# Patient Record
Sex: MALE | Race: WHITE | Employment: FULL TIME | URBAN - METROPOLITAN AREA
[De-identification: names, ages, dates, MRNs, and addresses within clinical notes are randomized per-mention and may not be internally consistent; named-entity substitution may affect disease eponyms.]

---

## 2022-10-10 ENCOUNTER — HOSPITAL ENCOUNTER (OUTPATIENT)
Dept: PSYCHIATRY | Age: 57
Setting detail: THERAPIES SERIES
Discharge: HOME | End: 2022-10-10

## 2022-10-10 PROCEDURE — 90791 PSYCH DIAGNOSTIC EVALUATION: CPT

## 2022-10-10 PROCEDURE — 80305 DRUG TEST PRSMV DIR OPT OBS: CPT

## 2022-10-10 PROCEDURE — 90834 PSYTX W PT 45 MINUTES: CPT

## 2022-10-10 ASSESSMENT — ANXIETY QUESTIONNAIRES
3. WORRYING TOO MUCH ABOUT DIFFERENT THINGS: 1
IF YOU CHECKED OFF ANY PROBLEMS ON THIS QUESTIONNAIRE, HOW DIFFICULT HAVE THESE PROBLEMS MADE IT FOR YOU TO DO YOUR WORK, TAKE CARE OF THINGS AT HOME, OR GET ALONG WITH OTHER PEOPLE: NOT DIFFICULT AT ALL
5. BEING SO RESTLESS THAT IT IS HARD TO SIT STILL: 1
1. FEELING NERVOUS, ANXIOUS, OR ON EDGE: 1
6. BECOMING EASILY ANNOYED OR IRRITABLE: 1
4. TROUBLE RELAXING: 1
GAD7 TOTAL SCORE: 5
2. NOT BEING ABLE TO STOP OR CONTROL WORRYING: 0
7. FEELING AFRAID AS IF SOMETHING AWFUL MIGHT HAPPEN: 0
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
## 2022-10-10 NOTE — PROGRESS NOTES
[address] [place] [address] Urinalysis Laboratory Testing and Medical History Physician Order       Location: [] [place] [] Georges Cisse MD., [facility], Medical Director of [facility] Director orders for [address] [place] [address] clinical therapists to collect an urine sample from the below patient,  and medical order for placement in level of care documented on  [facility] 157 scanned order. Client: Nuno Moss   : 1965   Case# [id number]    Urine sample will be collected following the collections guidelines provided on Clinical Reference Laboratory [facility] custody form, and completion of the  [facility] Non-Federal chain of custody drug screening form. During the course of treatment, randomly a urine sample will be collected, at a minimum of one time a month, more frequently as needed, as part of the clinical outpatient alcohol and drug treatment program at [address] [place] [address]. Medical care recommendation for clients experiencing/reporting  medical concerns, who do not have a family physician and willing to attend  medical care will be assisted in seeking medical care. Clinical providers will refer clients to local family physicians practices as part of the  clients treatment plan and assist the client in gaining access to an appointment. Release of information will be requested to support the  clients seeking medical care.             Summary of Medical History  Prior to Admission medications    Not on File     Past Surgical History:   Procedure Laterality Date    APPENDECTOMY      LAPAROSCOPIC APPENDECTOMY  15/89/3813    And umbilical hernia repair     Past Medical History:   Diagnosis Date    Arthritis     Diverticulitis      Patient Active Problem List    Diagnosis Date Noted    Ruptured appendicitis 2016       Surekha Burrell  10/10/2022/10:03 AM
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
## 2022-10-21 ENCOUNTER — HOSPITAL ENCOUNTER (OUTPATIENT)
Dept: PSYCHIATRY | Age: 57
Setting detail: THERAPIES SERIES
Discharge: HOME | End: 2022-10-21

## 2022-10-21 PROCEDURE — 90834 PSYTX W PT 45 MINUTES: CPT
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
## 2022-10-21 NOTE — PROGRESS NOTES
[facility] TREATMENT PLAN      Location: [x] [place] [] [place]    Treatment plan: Initial    Strengths: hard worker, talented with mechanics, takes pride in work    Weakness/Limitations: lacks coping skills for anger     Service/Frequency/Duration: Individual 1 a week for 90 days, Group assigned 1 time a week for 90 days, Urinalysis random for 90 days, AA/NA 1-2 bi weekly for 90 days, and Case Management as needed for 90 days    Diagnosis: F10.10 Nondependent alcohol abuse-unspecified drinking behavior and F12.10 Nondependent cannabis abuse-unspecified use    Level of Care: 1 Outpatient Services     Problem: History of Substance use resulting in being on probation. Goal: Client will enhance personalized knowledge and insight associated with mood altering substances in 90 days   Objectives:   1) Client will remind self of 3-5 detrimental consequences in major life areas regarding AoD use in 90 days Evaluation Date: 1/21/2023 Code: C Continue TBD     2) Client will identify 4 to 8 benefits and gratitude's due to remaining substance free in 90 days: Evaluation Date:1/21/2023 Code: C Continue TBD     3) Client will identify 4 relapse triggers, define relapse, difference between internal and external triggers associated with substance use in 90 days and Evaluation Date:1/21/2023Code: C Continue TBD     2. Problem: Low Self-Esteem/Identify issues from drug and alcohol use.       Goal: Client will learn to focus on character strengths and feel better about himself in 90 days      Objectives:   1) Client will examine 5 strong thoughts and feeling through mediation and share in his individual sessions in 90 days:  Evaluation Date: 1/21/2023 Code: C Continue TBD      2) Client will explore 2-3 new outdoor/indoor activities that bring him merrill in 90 days Evaluation Date:1/21/2023 Code: C Continue TBD      3)  Utilize, if needed case management services provided by [employer] to enhance abstaining from substance use in 90 days Evaluation Date:1/21/2023 Code: C Continue TBD       3. Problem: History of Relapse due to lack of sober support. Goal: Client will increase his sober network to maintain long term sobriety in 90 days. Objectives:  1)  Client will attend 1-2 AA/NA in person or online meetings Biweekly to avoid relapse and build support in 90 days Evaluation Date:1/21/2023 Code: C Continue TBD     2) Client will enhance 4 to 8 healthy techniques and coping skills, for relapse prevention in 90 days Evaluation Date:1/21/2023 Code: C Continue TBD    3) Client will explore 3 new behaviors and resolve ambivalence associated with commitment to change behaviors related to substance use and addiction in 90 days. Evaluation Date:1/21/2023 Code: C Continue TBD    Defer: Client will get off probation. Discharge Plan/Instructions: Client will complete his treatment plan goals and objectives and remain abstinent from alcohol. He will remain compliant with his medical marijuana card. Sierra [place] / 1965 has participated in the treatment plan development outlined above on 10/21/2022.      Syed Harding, [facility]  10/21/2022/9:13 AM

## 2022-10-24 ENCOUNTER — HOSPITAL ENCOUNTER (OUTPATIENT)
Dept: PSYCHIATRY | Age: 57
Setting detail: THERAPIES SERIES
Discharge: HOME | End: 2022-10-24

## 2022-10-24 PROCEDURE — 90834 PSYTX W PT 45 MINUTES: CPT
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
## 2022-10-24 NOTE — PROGRESS NOTES
[facility] TREATMENT PLAN      Location: [x] [place] [] [facility]    Treatment plan: Initial    Strengths: hard worker, talented with mechanics, takes pride in work    Weakness/Limitations: lacks coping skills for anger     Service/Frequency/Duration: Individual 1 a week for 90 days, Group assigned 1 time a week for 90 days, Urinalysis random for 90 days, AA/NA 1-2 bi weekly for 90 days, and Case Management as needed for 90 days    Diagnosis: F10.10 Nondependent alcohol abuse-unspecified drinking behavior and F12.10 Nondependent cannabis abuse-unspecified use    Level of Care: 1 Outpatient Services     Problem: History of Substance use resulting in being on probation. Goal: Client will enhance personalized knowledge and insight associated with mood altering substances in 90 days   Objectives:   1) Client will remind self of 3-5 detrimental consequences in major life areas regarding AoD use in 90 days Evaluation Date: 1/21/2023 Code: Achieved 10/24/2022 Client reports financially his MICHAEL's have ruined him, his relationships have been affected and legal issues. 2) Client will identify 4 to 8 benefits and gratitude's due to remaining substance free in 90 days: Evaluation Date:1/21/2023 Code: C Continue TBD     3) Client will identify 4 relapse triggers, define relapse, difference between internal and external triggers associated with substance use in 90 days and Evaluation Date:1/21/2023Code: C Continue TBD     2. Problem: Low Self-Esteem/Identify issues from drug and alcohol use.       Goal: Client will learn to focus on character strengths and feel better about himself in 90 days      Objectives:   1) Client will examine 5 strong thoughts and feeling through mediation and share in his individual sessions in 90 days:  Evaluation Date: 1/21/2023 Code: C Continue TBD      2) Client will explore 2-3 new outdoor/indoor activities that bring him merrill in 90 days Evaluation Date:1/21/2023 Code: C Continue TBD 3)  Utilize, if needed case management services provided by [facility] to enhance abstaining from substance use in 90 days Evaluation Date:1/21/2023 Code: C Continue TBD       3. Problem: History of Relapse due to lack of sober support. Goal: Client will increase his sober network to maintain long term sobriety in 90 days. Objectives:  1)  Client will attend 1-2 AA/NA in person or online meetings Biweekly to avoid relapse and build support in 90 days Evaluation Date:1/21/2023 Code: C Continue TBD     2) Client will enhance 4 to 8 healthy techniques and coping skills, for relapse prevention in 90 days Evaluation Date:1/21/2023 Code: C Continue TBD    3) Client will explore 3 new behaviors and resolve ambivalence associated with commitment to change behaviors related to substance use and addiction in 90 days. Evaluation Date:1/21/2023 Code: C Continue TBD    Defer: Client will get off probation. Discharge Plan/Instructions: Client will complete his treatment plan goals and objectives and remain abstinent from alcohol. He will remain compliant with his medical marijuana card. Kemal Danielle / 1965 has participated in the treatment plan development outlined above on 10/24/2022.      Zohreh Loya LCDCIII  10/24/2022/10:01 AM

## 2022-10-27 ENCOUNTER — HOSPITAL ENCOUNTER (OUTPATIENT)
Dept: PSYCHIATRY | Age: 57
Setting detail: THERAPIES SERIES
Discharge: HOME | End: 2022-10-27

## 2022-10-27 PROCEDURE — 90853 GROUP PSYCHOTHERAPY: CPT

## 2022-10-27 NOTE — GROUP NOTE
612 [facility] Group Therapy Note      10/27/2022    Location:  [facility]        Clients Presents: [phone], [phone]    Clients Absent: [id number], [id number]    Length of session: 1.5 hours    Group Note: OP    Group Type: Co-Ed    New members were welcomed and introduced. Norms and expectations of group were discussed. Content: Understanding Anger and the impact of Substance Use         PAOLO Joyce  06/99/0672 6:56 PM      Co-Therapist: N/A      [facility] Individual Group Progress Note    Jyoti Vee  1965  10/27/2022    Notes on Client Progress in [address] [place] [address] attended group: introduced himself and events leading to his arrival: presented check in information: identified history of conflict with x wife.            PAOLO Joyce  05/33/8389 7:04 PM        Co-Therapist: N/A

## 2022-10-31 ENCOUNTER — HOSPITAL ENCOUNTER (OUTPATIENT)
Dept: PSYCHIATRY | Age: 57
Setting detail: THERAPIES SERIES
Discharge: HOME | End: 2022-10-31

## 2022-10-31 NOTE — PROGRESS NOTES
[address] [facility]        Individual  Progress Note    Location: [x] [place] [] [facility]                   Patient Name: Wilman Canales   : 1965     Case # :  [id number]  Therapist: PETER Lucio        Objective/Service/Time:     Client did not show for his session this morning. Counselor called client and sent letter of intent.          Electronically signed by Neymar Heaton on 10/31/2022 at 10:19 AM

## 2022-11-03 ENCOUNTER — HOSPITAL ENCOUNTER (OUTPATIENT)
Dept: PSYCHIATRY | Age: 57
Setting detail: THERAPIES SERIES
Discharge: HOME | End: 2022-11-03

## 2022-11-03 PROCEDURE — 90853 GROUP PSYCHOTHERAPY: CPT

## 2022-11-07 ENCOUNTER — HOSPITAL ENCOUNTER (OUTPATIENT)
Dept: PSYCHIATRY | Age: 57
Setting detail: THERAPIES SERIES
Discharge: HOME | End: 2022-11-07

## 2022-11-07 PROCEDURE — 90834 PSYTX W PT 45 MINUTES: CPT

## 2022-11-07 PROCEDURE — 80305 DRUG TEST PRSMV DIR OPT OBS: CPT
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
## 2022-11-10 ENCOUNTER — HOSPITAL ENCOUNTER (OUTPATIENT)
Dept: PSYCHIATRY | Age: 57
Setting detail: THERAPIES SERIES
Discharge: HOME | End: 2022-11-10

## 2022-11-10 PROCEDURE — 90853 GROUP PSYCHOTHERAPY: CPT

## 2022-11-11 NOTE — GROUP NOTE
[address] [facility] Group Therapy Note      11/10/2022    Location:  [facility]        Clients Presents: [phone]        Clients Absent: 1322, 1276, 1317      Length of session: 1.5 hours    Group Note: OP    Group Type: Men's    New members were welcomed and introduced. Norms and expectations of group were discussed. Content:         PAOLO Rausch  02/44/1284 7:35 PM        Co-Therapist: N/A      [facility] Individual Group Progress Note    Jv Gibbs  1965  11/10/2022    Notes on Client Progress in [address] [place] [address] attended group: introduced himself and events leading to arrival: presented check in information: identified  anger avoidance.           PAOLO Rausch  21/91/6602 7:44 PM        Co-Therapist: N/A

## 2022-11-14 ENCOUNTER — HOSPITAL ENCOUNTER (OUTPATIENT)
Dept: PSYCHIATRY | Age: 57
Setting detail: THERAPIES SERIES
Discharge: HOME | End: 2022-11-14

## 2022-11-14 PROCEDURE — 90834 PSYTX W PT 45 MINUTES: CPT

## 2022-11-14 NOTE — PROGRESS NOTES
[facility]REAT[facility]      Location: [x] [place] [] Cindy abbasi    Treatment plan: Initial    Strengths: hard worker, talented with mechanics, takes pride in work    Weakness/Limitations: lacks coping skills for anger     Service/Frequency/Duration: Individual 1 a week for 90 days, Group assigned 1 time a week for 90 days, Urinalysis random for 90 days, AA/NA 1-2 bi weekly for 90 days, and Case Management as needed for 90 days    Diagnosis: F10.10 Nondependent alcohol abuse-unspecified drinking behavior and F12.10 Nondependent cannabis abuse-unspecified use    Level of Care: 1 Outpatient Services     Problem: History of Substance use resulting in being on probation. Goal: Client will enhance personalized knowledge and insight associated with mood altering substances in 90 days   Objectives:   1) Client will remind self of 3-5 detrimental consequences in major life areas regarding AoD use in 90 days Evaluation Date: 1/21/2023 Code: Achieved 10/24/2022 Client reports financially his MICHAEL's have ruined him, his relationships have been affected and legal issues. 2) Client will identify 4 to 8 benefits and gratitude's due to remaining substance free in 90 days: Evaluation Date:1/21/2023 Code: C Continue TBD     3) Client will identify 4 relapse triggers, define relapse, difference between internal and external triggers associated with substance use in 90 days and Evaluation Date:1/21/2023Code: C Continue TBD     2. Problem: Low Self-Esteem/Identify issues from drug and alcohol use.       Goal: Client will learn to focus on character strengths and feel better about himself in 90 days      Objectives:   1) Client will examine 5 strong thoughts and feeling through mediation and share in his individual sessions in 90 days:  Evaluation Date: 1/21/2023 Code: C Continue TBD      2) Client will explore 2-3 new outdoor/indoor activities that bring him merrill in 90 days Evaluation Date:1/21/2023 Code: C Continue TBD 3)  Utilize, if needed case management services provided by [facility] to enhance abstaining from substance use in 90 days Evaluation Date:1/21/2023 Code: C Continue TBD       3. Problem: History of Relapse due to lack of sober support. Goal: Client will increase his sober network to maintain long term sobriety in 90 days. Objectives:  1)  Client will attend 1-2 AA/NA in person or online meetings Biweekly to avoid relapse and build support in 90 days Evaluation Date:1/21/2023 Code: C Continue TBD     2) Client will enhance 4 to 8 healthy techniques and coping skills, for relapse prevention in 90 days Evaluation Date:1/21/2023 Code: Continue 11/7/2022 Client reports he is trying to focus on the positives in his day. 3) Client will explore 3 new behaviors and resolve ambivalence associated with commitment to change behaviors related to substance use and addiction in 90 days. Evaluation Date:1/21/2023 Code: Continue 11/14/2022 Client has a relationship with an elderly neighbor that is helping him re-evaluate his outlook on life. Defer: Client will get off probation. Discharge Plan/Instructions: Client will complete his treatment plan goals and objectives and remain abstinent from alcohol. He will remain compliant with his medical marijuana card. Marina Javier / 1965 has participated in the treatment plan development outlined above on 11/14/2022.      Jodie Davis, LCDCIII  11/14/2022/10:03 AM

## 2022-11-14 NOTE — PROGRESS NOTES
612 [facility]        Individual  Progress Note    Location: [x] [place] [] Cindy abbasi                   Patient Name: Aiyana Whalen   : 1965     Case # :  [id number]  Therapist: PETER Hester        Objective/Service/Time: kept 1 hour individual     Goal 3 Objective 3 continue    S-Client is a 59year old  male on probation. Client denies any use except for his medical marijuana. Client denies any current obstacles or stressors. 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 He stated, \"It is what I do. I love cars. I am working on a old [employer] now\". Client reports group is going well. He shared about a relationship with an elderly neighbor he has been watching over for years and is now having to get verbal with and set boundaries with her. She tries to help out but ends up making more of a mess and he had to tell her to stop. He shared he is grateful for the friendship but still has to set boundaries. O-Client was cooperative, pleasant and oriented x 4. A-Client has some insight into his AoD use but still minimizes his use. He shared a little about his trouble with women and he blames his trouble with them on them. It was their drinking that caused the problem. Counselor encouraged client to journal.     P-Continue services.          Electronically signed by Rachel Cooper on 2022 at 11:05 AM

## 2022-11-17 ENCOUNTER — HOSPITAL ENCOUNTER (OUTPATIENT)
Dept: PSYCHIATRY | Age: 57
Setting detail: THERAPIES SERIES
Discharge: HOME | End: 2022-11-17

## 2022-11-17 PROCEDURE — 90853 GROUP PSYCHOTHERAPY: CPT

## 2022-11-18 NOTE — GROUP NOTE
612 [facility] Group Therapy Note      11/17/2022    Location:  [employer]      Clients Presents: [id number], 5006, 9125, 1331, 1299, 1313, 1317, 1311      Clients Absent: 1322, 1276, 1264, 1278      Length of session: 1.5 hours    Group Note: OP    Group Type: Co-Ed      New members were welcomed and introduced. Norms and expectations of group were discussed.       Content: Understanding Relapse and Substance Use         PAOLO Gallegos  11/11/2535 6:58 PM      Co-Therapist: N/A      [facility] Individual Group Progress Note    Marina Javier  1965  11/17/2022    Notes on Client Progress in [address] [place] [address] group, introduced himself and events leading to arrival: presented check in information: attentive to information           PAOLO Gallegos  82/73/8899 7:12 PM      Co-Therapist: N/A

## 2022-11-28 ENCOUNTER — HOSPITAL ENCOUNTER (OUTPATIENT)
Dept: PSYCHIATRY | Age: 57
Setting detail: THERAPIES SERIES
Discharge: HOME | End: 2022-11-28

## 2022-11-28 PROCEDURE — 90834 PSYTX W PT 45 MINUTES: CPT

## 2022-11-28 NOTE — PROGRESS NOTES
612 [facility]        Individual  Progress Note    Location: [x] [place] [] Stefan Chowdary                   Patient Name: Amilcar Delgado   : 1965     Case # :  [id number]  Therapist: PETER Liriano        Objective/Service/Time: Kept 1 hour individual     Goal 3 Objective 3 continue    S-Client is a 59year old  male on probation. Client denies any use or cravings. He shared he has been very ill for several days with the flu. Client stated, \"I have been sick since Tuesday last week. I feel rough. I haven't really eaten and feel weak. I am going to leave here and  more medicine before I go home and go to bed\". Client shared he talked with his mom on  and she was fine. He reports he would have liked to go to [place] and see her for the [address] but he can't drive without a valid license. Client shared he may try and find a ride for [employer]. He shared he is examining his communication skills and his relationships with his coworkers and friends. Client stated, \"I get so angry and sometimes I get over angry if you know what I mean. I am trying to figure out why I get so frustrated with people\". O-Client was cooperative, pleasant although under the weather and oriented x 4. He shared thoughts and feelings openly. A-Client has some insight into his alcohol use and consequences. He tends to minimize his use. Client has hobbies and interests to fill his time. He enjoys his work and has a passion for cars. Client lacks family, reporting only a mother who is 80 and lives in [place]. Client has small friend [place]. Counselor encouraged client to engage in support meetings and client is not open at this time. P-Continue services.        Electronically signed by Eli Borrego on 2022 at 11:04 AM
3)  Utilize, if needed case management services provided by [facility] to enhance abstaining from substance use in 90 days Evaluation Date:1/21/2023 Code: C Continue TBD       3. Problem: History of Relapse due to lack of sober support. Goal: Client will increase his sober network to maintain long term sobriety in 90 days. Objectives:  1)  Client will attend 1-2 AA/NA in person or online meetings Biweekly to avoid relapse and build support in 90 days Evaluation Date:1/21/2023 Code: C Continue TBD     2) Client will enhance 4 to 8 healthy techniques and coping skills, for relapse prevention in 90 days Evaluation Date:1/21/2023 Code: Continue 11/7/2022 Client reports he is trying to focus on the positives in his day. 3) Client will explore 3 new behaviors and resolve ambivalence associated with commitment to change behaviors related to substance use and addiction in 90 days. Evaluation Date:1/21/2023 Code: Continue 11/28/2022 Client is focusing on his work and examining his relationships. He is working on communication. Defer: Client will get off probation. Discharge Plan/Instructions: Client will complete his treatment plan goals and objectives and remain abstinent from alcohol. He will remain compliant with his medical marijuana card. Joseph Amor / 1965 has participated in the treatment plan development outlined above on 11/28/2022.      [facility], [address] [facility]  11/28/2022/10:57 AM

## 2022-12-01 ENCOUNTER — HOSPITAL ENCOUNTER (OUTPATIENT)
Dept: PSYCHIATRY | Age: 57
Setting detail: THERAPIES SERIES
Discharge: HOME | End: 2022-12-01

## 2022-12-02 NOTE — GROUP NOTE
612 [facility] Group Therapy Note      12/1/2022    Location:  [employer]        Clients Presents: 0128, [id number], 6925, 1311, 1264, 1299, 1317    Clients Absent: 1313, 1276    Length of session: 1.5 hours    Group Note: OP    Group Type: Co-Ed    New members were welcomed and introduced. Norms and expectations of group were discussed.       Content: Cognitive Thinking Errors, Barriers and Substance Use          PAOLO Roman  29/4/6504 5:43 PM      Co-Therapist: N/A      [facility] Individual Group Progress Note    Dante Spring  1965  12/1/2022    Notes on Client Progress in Group        Did not show         PAOLO Roman  01/4/4084 9:88 PM        Co-Therapist: N/A

## 2022-12-02 NOTE — PROGRESS NOTES
612 [facility]        Individual  Progress Note    Location: [x] [place] [] Cindy abbasi                   Patient Name: Saw Brower   : 1965     Case # :  [id number]  Therapist: PETER Donaldson        Objective/Service/Time: case management     This client called at 8:40 AM reporting he missed group last evening at 5:30pm due to still being sick. He apologized and shared he hopes to be feeling better by Monday for his individual session. Client voiced being worried about getting into trouble with probation for being absent.  Counselor tried to reassure him he would be fine and to take care of himself and get some rest.         Electronically signed by Desmond Gomez on 2022 at 8:43 AM

## 2022-12-05 ENCOUNTER — HOSPITAL ENCOUNTER (OUTPATIENT)
Dept: PSYCHIATRY | Age: 57
Setting detail: THERAPIES SERIES
Discharge: HOME | End: 2022-12-05

## 2022-12-05 NOTE — PROGRESS NOTES
[facility]REAT[facility]      Location: [x] [place] [] [facility]    Treatment plan: Initial    Strengths: hard worker, talented with mechanics, takes pride in work    Weakness/Limitations: lacks coping skills for anger     Service/Frequency/Duration: Individual 1 a week for 90 days, Group assigned 1 time a week for 90 days, Urinalysis random for 90 days, AA/NA 1-2 bi weekly for 90 days, and Case Management as needed for 90 days    Diagnosis: F10.10 Nondependent alcohol abuse-unspecified drinking behavior and F12.10 Nondependent cannabis abuse-unspecified use    Level of Care: 1 Outpatient Services     Problem: History of Substance use resulting in being on probation. Goal: Client will enhance personalized knowledge and insight associated with mood altering substances in 90 days   Objectives:   1) Client will remind self of 3-5 detrimental consequences in major life areas regarding AoD use in 90 days Evaluation Date: 1/21/2023 Code: Achieved 10/24/2022 Client reports financially his MICHAEL's have ruined him, his relationships have been affected and legal issues. 2) Client will identify 4 to 8 benefits and gratitude's due to remaining substance free in 90 days: Evaluation Date:1/21/2023 Code: C Continue TBD     3) Client will identify 4 relapse triggers, define relapse, difference between internal and external triggers associated with substance use in 90 days and Evaluation Date:1/21/2023Code: C Continue TBD     2. Problem: Low Self-Esteem/Identify issues from drug and alcohol use.       Goal: Client will learn to focus on character strengths and feel better about himself in 90 days      Objectives:   1) Client will examine 5 strong thoughts and feeling through mediation and share in his individual sessions in 90 days:  Evaluation Date: 1/21/2023 Code: C Continue TBD      2) Client will explore 2-3 new outdoor/indoor activities that bring him merrill in 90 days Evaluation Date:1/21/2023 Code: C Continue TBD 3)  Utilize, if needed case management services provided by [facility] to enhance abstaining from substance use in 90 days Evaluation Date:1/21/2023 Code: C Continue TBD       3. Problem: History of Relapse due to lack of sober support. Goal: Client will increase his sober network to maintain long term sobriety in 90 days. Objectives:  1)  Client will attend 1-2 AA/NA in person or online meetings Biweekly to avoid relapse and build support in 90 days Evaluation Date:1/21/2023 Code: C Continue TBD     2) Client will enhance 4 to 8 healthy techniques and coping skills, for relapse prevention in 90 days Evaluation Date:1/21/2023 Code: Continue 11/7/2022 Client reports he is trying to focus on the positives in his day. 3) Client will explore 3 new behaviors and resolve ambivalence associated with commitment to change behaviors related to substance use and addiction in 90 days. Evaluation Date:1/21/2023 Code: Continue 11/28/2022 Client is focusing on his work and examining his relationships. He is working on communication. Defer: Client will get off probation. Discharge Plan/Instructions: Client will complete his treatment plan goals and objectives and remain abstinent from alcohol. He will remain compliant with his medical marijuana card. Aiyana Whalen / 1965 has participated in the treatment plan development outlined above on 12/5/2022.      Benny Renner, [address]  12/5/2022/10:13 AM

## 2022-12-05 NOTE — PROGRESS NOTES
612 [facility]        Individual  Progress Note    Location: [x] [place] []                    Patient Name: Darian Epstein   : 1965     Case # :  [id number]  Therapist: PETER Grace        Objective/Service/Time:     Client came in for his individual session. When I went to greet him in the lobby he was obviously very ill. Client reports being very ill for the past 2 weeks and he is going to head to urgent care now. This counselor told client he was excused and in the future to please call and not come in the office if he is sick.          Electronically signed by Damion Cavazos on 2022 at 10:10 AM detailed exam

## 2022-12-08 ENCOUNTER — HOSPITAL ENCOUNTER (OUTPATIENT)
Dept: PSYCHIATRY | Age: 57
Setting detail: THERAPIES SERIES
Discharge: HOME | End: 2022-12-08

## 2022-12-09 NOTE — GROUP NOTE
612 [facility] Group Therapy Note      12/8/2022    Location:  [employer]      Clients Presents: [id number], [id number], 9125, 1331, 1299, 1317    Clients Absent: 1264, 1311, 1276, 1313    Length of session: 1.5 hours    Group Note: OP    Group Type: Co-Ed    New members were welcomed and introduced. Norms and expectations of group were discussed.       Content: Understanding Dysfunctional Families and Substance Use       PAOLO Gallegos  20/6/9845 1:51 PM      Co-Therapist: N/A      [facility] Progress Note    Marina Javier  1965  12/8/2022    Notes on Client Progress in Group      Cancel: illness: pneumonia           PAOLO Gallegos  86/1/0649 8:19 PM        Co-Therapist: N/A

## 2022-12-12 ENCOUNTER — HOSPITAL ENCOUNTER (OUTPATIENT)
Dept: PSYCHIATRY | Age: 57
Setting detail: THERAPIES SERIES
Discharge: HOME | End: 2022-12-12

## 2022-12-12 PROCEDURE — 90834 PSYTX W PT 45 MINUTES: CPT

## 2022-12-12 PROCEDURE — 80305 DRUG TEST PRSMV DIR OPT OBS: CPT

## 2022-12-12 NOTE — PROGRESS NOTES
[facility]REAT[facility]      Location: [x] [place] [] Cindy abbasi    Treatment plan: Initial    Strengths: hard worker, talented with mechanics, takes pride in work    Weakness/Limitations: lacks coping skills for anger     Service/Frequency/Duration: Individual 1 a week for 90 days, Group assigned 1 time a week for 90 days, Urinalysis random for 90 days, AA/NA 1-2 bi weekly for 90 days, and Case Management as needed for 90 days    Diagnosis: F10.10 Nondependent alcohol abuse-unspecified drinking behavior and F12.10 Nondependent cannabis abuse-unspecified use    Level of Care: 1 Outpatient Services     Problem: History of Substance use resulting in being on probation. Goal: Client will enhance personalized knowledge and insight associated with mood altering substances in 90 days   Objectives:   1) Client will remind self of 3-5 detrimental consequences in major life areas regarding AoD use in 90 days Evaluation Date: 1/21/2023 Code: Achieved 10/24/2022 Client reports financially his MICHAEL's have ruined him, his relationships have been affected and legal issues. 2) Client will identify 4 to 8 benefits and gratitude's due to remaining substance free in 90 days: Evaluation Date:1/21/2023 Code: C Continue TBD     3) Client will identify 4 relapse triggers, define relapse, difference between internal and external triggers associated with substance use in 90 days and Evaluation Date:1/21/2023Code: Achieved Client denies any external triggers, Client reports grief and loss is his big internal trigger. 2.    Problem: Low Self-Esteem/Identify issues from drug and alcohol use.       Goal: Client will learn to focus on character strengths and feel better about himself in 90 days      Objectives:   1) Client will examine 5 strong thoughts and feeling through mediation and share in his individual sessions in 90 days:  Evaluation Date: 1/21/2023 Code: C Continue TBD      2) Client will explore 2-3 new outdoor/indoor activities that bring him merrill in 90 days Evaluation Date:1/21/2023 Code: C Continue TBD      3)  Utilize, if needed case management services provided by [facility] to enhance abstaining from substance use in 90 days Evaluation Date:1/21/2023 Code: C Continue TBD       3. Problem: History of Relapse due to lack of sober support. Goal: Client will increase his sober network to maintain long term sobriety in 90 days. Objectives:  1)  Client will attend 1-2 AA/NA in person or online meetings Biweekly to avoid relapse and build support in 90 days Evaluation Date:1/21/2023 Code: C Continue TBD     2) Client will enhance 4 to 8 healthy techniques and coping skills, for relapse prevention in 90 days Evaluation Date:1/21/2023 Code: Continue 11/7/2022 Client reports he is trying to focus on the positives in his day. 3) Client will explore 3 new behaviors and resolve ambivalence associated with commitment to change behaviors related to substance use and addiction in 90 days. Evaluation Date:1/21/2023 Code: Continue 11/28/2022 Client is focusing on his work and examining his relationships. He is working on communication. Defer: Client will get off probation. Discharge Plan/Instructions: Client will complete his treatment plan goals and objectives and remain abstinent from alcohol. He will remain compliant with his medical marijuana card. Matias Evans / 1965 has participated in the treatment plan development outlined above on 12/12/2022.      Ivan January, [address]  12/12/2022/10:27 AM

## 2022-12-12 NOTE — PROGRESS NOTES
612 [facility]        Individual  Progress Note    Location: [x] [place] [] Cindy abbasi                   Patient Name: Kali Foster   : 1965     Case # :  [id number]  Therapist: PETER Castaneda        Objective/Service/Time: kept 1 hour individual     Goal 1 Objective 3 achieved     S-Client is a 59year old  male on probation. Client denies any use or cravings and only reports cough syrup 2 days ago. Client shared he feels much better. Client is going back to work today for the first time in over a week. He is still on antibiotics. Counselor and client explored client's triggers. Client denies understanding triggers. After explaining triggers and the difference internal/external client stated, \"I don't have any external triggers. No one is going to influence me to drink. I didn't drink like that, like regular times. I drank when I lost Fozia Alayna\". Client shared the only internal he identified was grief and loss and anger and stress. Client submitted to UDS. O-Client was cooperative, pleasant and oriented x 4. A-Client has good insight into his alcohol use and triggers, he has medical marijuana card and continues to use it. Client has little to no support. He isolates he prefers it that way. Counselor suggested [employer]. P-Continue services.          Electronically signed by Shalom Leblanc on 2022 at 10:39 AM

## 2022-12-15 ENCOUNTER — HOSPITAL ENCOUNTER (OUTPATIENT)
Dept: PSYCHIATRY | Age: 57
Setting detail: THERAPIES SERIES
Discharge: HOME | End: 2022-12-15

## 2022-12-15 PROCEDURE — 90853 GROUP PSYCHOTHERAPY: CPT

## 2022-12-15 NOTE — GROUP NOTE
612 [facility] Group Therapy Note      12/15/2022    Location:  [employer]        Clients Presents: 1924, [id number], 1311, 3580,5984,1241    Clients Absent: 1328, 1331, 1317    Length of session: 1.5 hours    Group Note: OP    Group Type: Co-Ed    New members were welcomed and introduced. Norms and expectations of group were discussed. Content: Understanding Defense Mechanisms and Substance Use         PEDRO WhitmanKAYLAN  91/67/3372 6:56 PM    Co-Therapist: N/A      [facility] Progress Note    Ian Anglin  1965  12/15/2022    Notes on Client Progress in [address] [place] [address] attended group, introduced himself and events leading to arrival, presented check in information: identified denial and  passivity.          PAOLO Whitman  25/75/0536 7:13 PM        Co-Therapist: N/A

## 2022-12-19 ENCOUNTER — HOSPITAL ENCOUNTER (OUTPATIENT)
Dept: PSYCHIATRY | Age: 57
Setting detail: THERAPIES SERIES
Discharge: HOME | End: 2022-12-19

## 2022-12-19 PROCEDURE — 90834 PSYTX W PT 45 MINUTES: CPT

## 2022-12-19 NOTE — PROGRESS NOTES
612 [facility]        Individual  Progress Note    Location: [x] [place] [] [place]                   Patient Name: Rachel Ornelas   : 1965     Case # :  [id number]  Therapist: PETER Osorio        Objective/Service/Time: kept 1 hour individual     Goal 2 Objective 2 continue    S-Client is a 59year old  male on probation. Client denies any use except medical marijuana. Client shared he has been feeling good. He spent the weekend helping a florence go to [place] to  a classic car. Client stated \"We drove up there and it was snowing. We didn't end up being able to load it due to the snow. We have to go back this weekend and hopefully we can get it on the trailer\". Client is still working and using coping skills to not drink alcohol. Client denies needing any case management services. He denies any current obstacles for his recovery. O-Client was cooperative, pleasant and oriented x 4. A-Client has some insight into his alcohol use and consequences. Client has little support and has not engaged in 12 step meetings yet. Counselor encouraged online or in person. P-Continue services.          Electronically signed by Misael Goldstein on 2022 at 10:41 AM

## 2022-12-19 NOTE — PROGRESS NOTES
[facility] TREATMENT PLAN      Location: [x] [place] [] Cindy abbasi    Treatment plan: Initial    Strengths: hard worker, talented with mechanics, takes pride in work    Weakness/Limitations: lacks coping skills for anger     Service/Frequency/Duration: Individual 1 a week for 90 days, Group assigned 1 time a week for 90 days, Urinalysis random for 90 days, AA/NA 1-2 bi weekly for 90 days, and Case Management as needed for 90 days    Diagnosis: F10.10 Nondependent alcohol abuse-unspecified drinking behavior and F12.10 Nondependent cannabis abuse-unspecified use    Level of Care: 1 Outpatient Services     Problem: History of Substance use resulting in being on probation. Goal: Client will enhance personalized knowledge and insight associated with mood altering substances in 90 days   Objectives:   1) Client will remind self of 3-5 detrimental consequences in major life areas regarding AoD use in 90 days Evaluation Date: 1/21/2023 Code: Achieved 10/24/2022 Client reports financially his MICHAEL's have ruined him, his relationships have been affected and legal issues. 2) Client will identify 4 to 8 benefits and gratitude's due to remaining substance free in 90 days: Evaluation Date:1/21/2023 Code: C Continue TBD     3) Client will identify 4 relapse triggers, define relapse, difference between internal and external triggers associated with substance use in 90 days and Evaluation Date:1/21/2023Code: Achieved Client denies any external triggers, Client reports grief and loss is his big internal trigger. 2.    Problem: Low Self-Esteem/Identify issues from drug and alcohol use.       Goal: Client will learn to focus on character strengths and feel better about himself in 90 days      Objectives:   1) Client will examine 5 strong thoughts and feeling through mediation and share in his individual sessions in 90 days:  Evaluation Date: 1/21/2023 Code: C Continue TBD      2) Client will explore 2-3 new outdoor/indoor activities that bring him merrill in 90 days Evaluation Date:1/21/2023 Code: Continue 12/19/2022 Client went with a florence to  a classic car in [place]. 3)  Utilize, if needed case management services provided by [facility] to enhance abstaining from substance use in 90 days Evaluation Date:1/21/2023 Code: Discontinued 12/19/2022       3. Problem: History of Relapse due to lack of sober support. Goal: Client will increase his sober network to maintain long term sobriety in 90 days. Objectives:  1)  Client will attend 1-2 AA/NA in person or online meetings Biweekly to avoid relapse and build support in 90 days Evaluation Date:1/21/2023 Code: C Continue TBD     2) Client will enhance 4 to 8 healthy techniques and coping skills, for relapse prevention in 90 days Evaluation Date:1/21/2023 Code: Continue 11/7/2022 Client reports he is trying to focus on the positives in his day. 3) Client will explore 3 new behaviors and resolve ambivalence associated with commitment to change behaviors related to substance use and addiction in 90 days. Evaluation Date:1/21/2023 Code: Continue 11/28/2022 Client is focusing on his work and examining his relationships. He is working on communication. Defer: Client will get off probation. Discharge Plan/Instructions: Client will complete his treatment plan goals and objectives and remain abstinent from alcohol. He will remain compliant with his medical marijuana card. Jonh Beal / 1965 has participated in the treatment plan development outlined above on 12/19/2022.      Selene Lao, [facility]  12/19/2022/10:02 AM

## 2022-12-22 ENCOUNTER — HOSPITAL ENCOUNTER (OUTPATIENT)
Dept: PSYCHIATRY | Age: 57
Setting detail: THERAPIES SERIES
Discharge: HOME | End: 2022-12-22

## 2022-12-22 NOTE — GROUP NOTE
[facility] Group Therapy Note      12/22/2022    Location:  [facility]      Clients Presents: 7130, 1346, 1311, 1313, 1317      Clients Absent: 1322, 1220, 9125, 1331, 1276      Length of session: 1.5 hours    Group Note: OP    Group Type: Co-Ed    New members were welcomed and introduced. Norms and expectations of group were discussed. Content:  Understanding Holidays: Relapse and [facility]  57/06/7400 6:30 PM      Co-Therapist: N/A      [facility] Individual Group Progress Note    Naila Meredith  1965  12/22/2022    Notes on Client Progress in [address] [place] [address] attended group, introduced himself and events leading to arrival: presented check in information: expressed plans to work, fix items and avoid counterproductive decisions.            Nely Mondragon, [facility]-  90/19/9140 6:48 PM        Co-Therapist: N/A

## 2022-12-27 ENCOUNTER — HOSPITAL ENCOUNTER (OUTPATIENT)
Dept: PSYCHIATRY | Age: 57
Setting detail: THERAPIES SERIES
Discharge: HOME | End: 2022-12-27

## 2022-12-27 NOTE — PROGRESS NOTES
[facility]        Individual  Progress Note    Location: [x] [place] [] Cindy abbasi                   Patient Name: Wilman Canales   : 1965     Case # :  [id number]  Therapist: PETER Lucio        Objective/Service/Time:     Client did not show for his session. Counselor called and left a voice message asking client to call [facility]ACH as soon as possible. I also sent a letter of intent.          Electronically signed by Neymar Heaton on 2022 at 10:33 AM

## 2022-12-29 ENCOUNTER — HOSPITAL ENCOUNTER (OUTPATIENT)
Dept: PSYCHIATRY | Age: 57
Setting detail: THERAPIES SERIES
Discharge: HOME | End: 2022-12-29

## 2022-12-29 PROCEDURE — 90853 GROUP PSYCHOTHERAPY: CPT

## 2022-12-30 NOTE — GROUP NOTE
612 [facility] Group Therapy Note      12/29/2022    Location:  [facility]      Clients Presents: [id number], [id number], 9125, 1311, 9513, 1313      Clients Absent: 1331, 1317      Length of session: 1.5 hours    Group Note: OP    Group Type: Co-Ed    New members were welcomed and introduced. Norms and expectations of group were discussed. Content: Understanding Mental Illness and Substance Use         PAOLO Hargrove  65/39/4031 7:02 PM      Co-Therapist: N/A      [facility] Individual Group Progress Note    Claudia Obrien  1965  12/29/2022    Notes on Client Progress in [address] [place] [address] attended group, introduced himself and events leading to arrival: presented check in information: attentive to sleep and nutrition; still identify pre contemplation and external motivation.              PAOLO Hargrove  29/07/9561 7:16 PM      Co-Therapist: N/A

## 2023-01-03 ENCOUNTER — HOSPITAL ENCOUNTER (OUTPATIENT)
Dept: PSYCHIATRY | Age: 58
Setting detail: THERAPIES SERIES
Discharge: HOME | End: 2023-01-03

## 2023-01-03 PROCEDURE — 90834 PSYTX W PT 45 MINUTES: CPT
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
## 2023-01-05 ENCOUNTER — HOSPITAL ENCOUNTER (OUTPATIENT)
Dept: PSYCHIATRY | Age: 58
Setting detail: THERAPIES SERIES
Discharge: HOME | End: 2023-01-05

## 2023-01-05 PROCEDURE — 90853 GROUP PSYCHOTHERAPY: CPT

## 2023-01-06 NOTE — GROUP NOTE
612 [facility] Group Therapy Note      1/5/2023    Location:  [facility]        Clients Presents: 3348, [phone] [id number], 6337    Clients Absent: 1322    Length of session: 1.5 hours    Group Note: OP    Group Type: Co-Ed    New members were welcomed and introduced. Norms and expectations of group were discussed. Content: Understanding Pharmacological Medication: Dual Diagnosis and Substance Use         PEDRO Levin-CS  2/1/5154 7:72 PM      Co-Therapist: N/A      [facility] Individual Group Progress Note    Luisana Gardner  1965  1/5/2023    Notes on Client Progress in [address] [place] [address] attended group, introduced himself and events leading to arrival: presented check in information: expressed financial complications generate his inability to purse medical care.            PEDRO Levin-CS  7/9/8820 8:99 PM        Co-Therapist: N/A

## 2023-01-09 ENCOUNTER — HOSPITAL ENCOUNTER (OUTPATIENT)
Dept: PSYCHIATRY | Age: 58
Setting detail: THERAPIES SERIES
Discharge: HOME | End: 2023-01-09

## 2023-01-09 PROCEDURE — 90834 PSYTX W PT 45 MINUTES: CPT

## 2023-01-09 PROCEDURE — 80305 DRUG TEST PRSMV DIR OPT OBS: CPT

## 2023-01-09 NOTE — PROGRESS NOTES
612 [facility]        Individual  Progress Note    Location: [x] [place] [] Cindy abbasi                   Patient Name: Francisco Stratton   : 1965     Case # :  [id number]  Therapist: PETER Fowler        Objective/Service/Time: Kept 1 hour individual    Goal 2 Objective 2 achieved    S-Client is a 59year old  male on probation. Client reports he is still sober from alcohol and using his medical marijuana. Client shared he is very angry about his [facility]ACH bill and stated, \"I am ready to explode! I got another bill from the hospital and this is just crazy! \" Client vented about cost of program for a while. Counselor informed client this was his last week and client was pleased with the news. Client shared about his hobbies and interests and what keeps him sober from alcohol. Client reports his interest in cars and collecting and remodeling cars has been his passion his whole life. 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 Client shared he doesn't struggle with anything in his daily life. He reports he is pretty happy most of the time. It is only this MICHAEL, the  bill and [facility] bill he is upset about. O-Client was cooperative, angry and oriented x 4. Client eventually calmed down. A-Client has some insight into his alcohol use and consequences. He understands he can still get an MICHAEL with the medical marijuana. 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 Client has been encouraged to attend 12 step meetings but he has not engaged in them so far and shared he probably won't attend. P-Continue services.          Electronically signed by Mildred Palacios on 2023 at 10:55 AM

## 2023-01-12 ENCOUNTER — HOSPITAL ENCOUNTER (OUTPATIENT)
Dept: PSYCHIATRY | Age: 58
Setting detail: THERAPIES SERIES
Discharge: HOME | End: 2023-01-12

## 2023-01-12 PROCEDURE — 90853 GROUP PSYCHOTHERAPY: CPT

## 2023-01-13 NOTE — GROUP NOTE
612 [facility] Group Therapy Note      1/12/2023    Location:  [employer]      Clients Presents: 0025,       Clients Absent: 1328, 1317, 9143      Length of session: 1.5 hours    Group Note: OP    Group Type: Co-Ed      New members were welcomed and introduced. Norms and expectations of group were discussed. Content: Understanding Medical and Physiological Aspects of Substance Use       PAOLO Alvarez  6/19/9140 1:86 PM      Co-Therapist: N/A      [facility] Individual Group Progress Note    Awais Hinton  1965  1/12/2023    Notes on Client Progress in [address] [place] [address] attended group, introduced himself and events leading to arrival: presented check in information: he was unable to recall period of abstinence since he started smoking: tends to intellectualize and justify use.       PEDRO Alvarez-KAYLAN  5/38/6553 0:48 PM        Co-Therapist: N/A

## 2023-01-16 ENCOUNTER — HOSPITAL ENCOUNTER (OUTPATIENT)
Dept: PSYCHIATRY | Age: 58
Setting detail: THERAPIES SERIES
Discharge: HOME | End: 2023-01-16

## 2023-01-16 PROCEDURE — 90834 PSYTX W PT 45 MINUTES: CPT

## 2023-01-16 NOTE — PROGRESS NOTES
612 [facility]        Individual  Progress Note    [facility] [place] [facility]                   Patient Name: Sameera Brizuela   : 1965     Case # :  [id number]  Therapist: PETER Lloyd        Objective/Service/Time: Kept 1 hour individual    S-Client is a 59year old  male on probation. Client denies any use of alcohol but is still using his medical marijuana. Client shared he ha been busy with work and side jobs. Client denies any current obstacles for his recovery. Counselor and client created a discharge treatment plan. Client shared he is not planning on remaining abstinent from alcohol but has a sober plan for when he does chose to drink. He stated, \"I am not an alcoholic, If I chose to have a beer I will drink responsibly. I will not drive. I will plan ahead or stay home\". O-Client was cooperative, pleasant and oriented x 4. A-Client has good insight into his drinking and shared about drinking the night of his MICHAEL more than he planned due to the loss of his friend. He was very upset and he knew he should not have been driving. He was angry at God but has worked through the grief. He has coping skills to manage anger and sadness. Client has a small support network and hobbies. P-Client is referred back to probation and encouraged to call [facility]ACH if he needs help in the future.              Electronically signed by Dyana Hernandez on 2023 at 10:08 AM

## 2023-01-16 NOTE — PROGRESS NOTES
612 [facility] Discharge Treatment Plan    Candys Hector  1965  Case # [id number]    Location: [x] [place] [] Kwasi Jackson. Stresses that I need to monitor  1. Financial   2. Stupid people  3.    4.     B. Major triggers to using alcohol/drugs   1. Grief   2.    3.      Sober Plan   1. Continue working   2. Continue setting goals   3.     C. Sobriety Support   1. FriendClarkalisa Gupta  2. Treatment staff: Tatyana Canales   3. Family member: mom    4. AA/NA recovery program, sponsor, meetings day/times, daily ready: none reported    D. Non-using activities  Collecting cars  2. Remote cars   3. Trains     E. Consequences of Drug/Alcohol use  1. Probation  2. Loss of money   3.     G. Short term goals to achieve  1. Client would like to take some time off and vacation. 2.  Client would like to build a new garage for his workshop. H. Follow up recommendations  1. Client is encouraged to continue to remain abstinent from alcohol. 2. Client is encouraged to call [facility] if he needs help in the future. Tami Young / 1965 has participated in the discharge treatment plan development outlined above on 1/16/2023.      Kareem Gage  1/16/2023/10:31 AM

## 2023-01-16 NOTE — PROGRESS NOTES
[facility] TREATMENT PLAN      Location: [x] [place] [] Cindy abbasi    Treatment plan: Initial    Strengths: hard worker, talented with mechanics, takes pride in work    Weakness/Limitations: lacks coping skills for anger     Service/Frequency/Duration: Individual 1 a week for 90 days, Group assigned 1 time a week for 90 days, Urinalysis random for 90 days, AA/NA 1-2 bi weekly for 90 days, and Case Management as needed for 90 days    Diagnosis: F10.10 Nondependent alcohol abuse-unspecified drinking behavior and F12.10 Nondependent cannabis abuse-unspecified use    Level of Care: 1 Outpatient Services     Problem: History of Substance use resulting in being on probation. Goal: Client will enhance personalized knowledge and insight associated with mood altering substances in 90 days   Objectives:   1) Client will remind self of 3-5 detrimental consequences in major life areas regarding AoD use in 90 days Evaluation Date: 1/21/2023 Code: Achieved 10/24/2022 Client reports financially his MICHAEL's have ruined him, his relationships have been affected and legal issues. 2) Client will identify 4 to 8 benefits and gratitude's due to remaining substance free in 90 days: Evaluation Date:1/21/2023 Code: Discontinued 1/16/2023     3) Client will identify 4 relapse triggers, define relapse, difference between internal and external triggers associated with substance use in 90 days and Evaluation Date:1/21/2023Code: Achieved Client denies any external triggers, Client reports grief and loss is his big internal trigger. 2.    Problem: Low Self-Esteem/Identify issues from drug and alcohol use.       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 3)  Utilize, if needed case management services provided by [facility] to enhance abstaining from substance use in 90 days Evaluation Date:1/21/2023 Code: Discontinued 12/19/2022       3. Problem: History of Relapse due to lack of sober support. Goal: Client will increase his sober network to maintain long term sobriety in 90 days. Objectives:  1)  Client will attend 1-2 AA/NA in person or online meetings Biweekly to avoid relapse and build support in 90 days Evaluation Date:1/21/2023 Code: Discontinued 1/16/2023     2) Client will enhance 4 to 8 healthy techniques and coping skills, for relapse prevention in 90 days Evaluation Date:1/21/2023 Code: Discontinued 1/16/2023    3) Client will explore 3 new behaviors and resolve ambivalence associated with commitment to change behaviors related to substance use and addiction in 90 days. Evaluation Date:1/21/2023 Code: Discontinued 1/16/2023     Defer: Client will get off probation. Discharge Plan/Instructions: Client will complete his treatment plan goals and objectives and remain abstinent from alcohol. He will remain compliant with his medical marijuana card. Ewa Vaughan / 1965 has participated in the treatment plan development outlined above on 1/16/2023.      Mavis Pozo  1/16/2023/10:00 AM

## 2023-01-19 ENCOUNTER — APPOINTMENT (OUTPATIENT)
Dept: PSYCHIATRY | Age: 58
End: 2023-01-19

## 2023-01-23 ENCOUNTER — APPOINTMENT (OUTPATIENT)
Dept: PSYCHIATRY | Age: 58
End: 2023-01-23

## 2023-01-26 ENCOUNTER — APPOINTMENT (OUTPATIENT)
Dept: PSYCHIATRY | Age: 58
End: 2023-01-26

## 2023-01-30 ENCOUNTER — APPOINTMENT (OUTPATIENT)
Dept: PSYCHIATRY | Age: 58
End: 2023-01-30

## 2024-03-13 NOTE — PROGRESS NOTES
[facility] TREATMENT [facility]AN      Location: [x] [place] [] [facility]    Treatment plan: Initial    Strengths: hard worker, talented with mechanics, takes pride in work    Weakness/Limitations: lacks coping skills for anger     Service/Frequency/Duration: Individual 1 a week for 90 days, Group assigned 1 time a week for 90 days, Urinalysis random for 90 days, AA/NA 1-2 bi weekly for 90 days, and Case Management as needed for 90 days    Diagnosis: F10.10 Nondependent alcohol abuse-unspecified drinking behavior and F12.10 Nondependent cannabis abuse-unspecified use    Level of Care: 1 Outpatient Services     Problem: History of Substance use resulting in being on probation. Goal: Client will enhance personalized knowledge and insight associated with mood altering substances in 90 days   Objectives:   1) Client will remind self of 3-5 detrimental consequences in major life areas regarding AoD use in 90 days Evaluation Date: 1/21/2023 Code: Achieved 10/24/2022 Client reports financially his MICHAEL's have ruined him, his relationships have been affected and legal issues. 2) Client will identify 4 to 8 benefits and gratitude's due to remaining substance free in 90 days: Evaluation Date:1/21/2023 Code: C Continue TBD     3) Client will identify 4 relapse triggers, define relapse, difference between internal and external triggers associated with substance use in 90 days and Evaluation Date:1/21/2023Code: Achieved Client denies any external triggers, Client reports grief and loss is his big internal trigger. 2.    Problem: Low Self-Esteem/Identify issues from drug and alcohol use.       Goal: Client will learn to focus on character strengths and feel better about himself in 90 days      Objectives:   1) Client will examine 5 strong thoughts and feeling through mediation and share in his individual sessions in 90 days:  Evaluation Date: 1/21/2023 Code: Continue 1/3/2023 Client reports he uses his medical marijuana daily to manage his strong emotions like frustration when his truck broke down. 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 3)  Utilize, if needed case management services provided by [facility] to enhance abstaining from substance use in 90 days Evaluation Date:1/21/2023 Code: Discontinued 12/19/2022       3. Problem: History of Relapse due to lack of sober support. Goal: Client will increase his sober network to maintain long term sobriety in 90 days. Objectives:  1)  Client will attend 1-2 AA/NA in person or online meetings Biweekly to avoid relapse and build support in 90 days Evaluation Date:1/21/2023 Code: C Continue TBD     2) Client will enhance 4 to 8 healthy techniques and coping skills, for relapse prevention in 90 days Evaluation Date:1/21/2023 Code: Continue 11/7/2022 Client reports he is trying to focus on the positives in his day. 3) Client will explore 3 new behaviors and resolve ambivalence associated with commitment to change behaviors related to substance use and addiction in 90 days. Evaluation Date:1/21/2023 Code: Continue 11/28/2022 Client is focusing on his work and examining his relationships. He is working on communication. Defer: Client will get off probation. Discharge Plan/Instructions: Client will complete his treatment plan goals and objectives and remain abstinent from alcohol. He will remain compliant with his medical marijuana card. Amilcar Delgado / 1965 has participated in the treatment plan development outlined above on 1/9/2023.      ROBERTH LirianoIII  1/9/2023/10:01 AM [Time Spent: ___ minutes] : I have spent [unfilled] minutes of time on the encounter.